# Patient Record
Sex: FEMALE | Race: WHITE | NOT HISPANIC OR LATINO | URBAN - METROPOLITAN AREA
[De-identification: names, ages, dates, MRNs, and addresses within clinical notes are randomized per-mention and may not be internally consistent; named-entity substitution may affect disease eponyms.]

---

## 2017-07-09 ENCOUNTER — COMMUNICATION - HEALTHEAST (OUTPATIENT)
Dept: FAMILY MEDICINE | Facility: CLINIC | Age: 57
End: 2017-07-09

## 2017-07-23 ENCOUNTER — COMMUNICATION - HEALTHEAST (OUTPATIENT)
Dept: FAMILY MEDICINE | Facility: CLINIC | Age: 57
End: 2017-07-23

## 2017-07-23 DIAGNOSIS — E78.5 HYPERLIPEMIA: ICD-10-CM

## 2017-09-25 ENCOUNTER — RECORDS - HEALTHEAST (OUTPATIENT)
Dept: ADMINISTRATIVE | Facility: OTHER | Age: 57
End: 2017-09-25

## 2017-11-03 ENCOUNTER — OFFICE VISIT - HEALTHEAST (OUTPATIENT)
Dept: FAMILY MEDICINE | Facility: CLINIC | Age: 57
End: 2017-11-03

## 2017-11-03 DIAGNOSIS — E78.5 HYPERLIPEMIA: ICD-10-CM

## 2017-11-03 DIAGNOSIS — Z12.31 VISIT FOR SCREENING MAMMOGRAM: ICD-10-CM

## 2017-11-03 DIAGNOSIS — M79.661 RIGHT CALF PAIN: ICD-10-CM

## 2017-11-03 DIAGNOSIS — Z12.11 SCREEN FOR COLON CANCER: ICD-10-CM

## 2017-11-06 ENCOUNTER — RECORDS - HEALTHEAST (OUTPATIENT)
Dept: VASCULAR ULTRASOUND | Facility: CLINIC | Age: 57
End: 2017-11-06

## 2017-11-06 ENCOUNTER — RECORDS - HEALTHEAST (OUTPATIENT)
Dept: ADMINISTRATIVE | Facility: OTHER | Age: 57
End: 2017-11-06

## 2017-11-06 DIAGNOSIS — M79.661 PAIN IN RIGHT LOWER LEG: ICD-10-CM

## 2017-11-22 ENCOUNTER — HOSPITAL ENCOUNTER (OUTPATIENT)
Dept: MAMMOGRAPHY | Facility: CLINIC | Age: 57
Discharge: HOME | End: 2017-11-22
Attending: FAMILY MEDICINE

## 2017-11-22 DIAGNOSIS — Z12.31 SCREENING MAMMOGRAM, ENCOUNTER FOR: ICD-10-CM

## 2018-01-23 ENCOUNTER — COMMUNICATION - HEALTHEAST (OUTPATIENT)
Dept: FAMILY MEDICINE | Facility: CLINIC | Age: 58
End: 2018-01-23

## 2018-01-23 DIAGNOSIS — E78.5 HYPERLIPEMIA: ICD-10-CM

## 2018-08-02 ENCOUNTER — COMMUNICATION - HEALTHEAST (OUTPATIENT)
Dept: FAMILY MEDICINE | Facility: CLINIC | Age: 58
End: 2018-08-02

## 2018-08-02 DIAGNOSIS — E78.5 HYPERLIPEMIA: ICD-10-CM

## 2018-10-25 ENCOUNTER — COMMUNICATION - HEALTHEAST (OUTPATIENT)
Dept: FAMILY MEDICINE | Facility: CLINIC | Age: 58
End: 2018-10-25

## 2018-10-25 DIAGNOSIS — E78.5 HYPERLIPEMIA: ICD-10-CM

## 2018-10-29 ENCOUNTER — RECORDS - HEALTHEAST (OUTPATIENT)
Dept: ADMINISTRATIVE | Facility: OTHER | Age: 58
End: 2018-10-29

## 2018-11-07 ENCOUNTER — OFFICE VISIT - HEALTHEAST (OUTPATIENT)
Dept: FAMILY MEDICINE | Facility: CLINIC | Age: 58
End: 2018-11-07

## 2018-11-07 DIAGNOSIS — Z12.11 SCREEN FOR COLON CANCER: ICD-10-CM

## 2018-11-07 DIAGNOSIS — E78.5 HYPERLIPEMIA: ICD-10-CM

## 2018-11-07 DIAGNOSIS — M54.42 ACUTE LEFT-SIDED LOW BACK PAIN WITH LEFT-SIDED SCIATICA: ICD-10-CM

## 2018-11-07 DIAGNOSIS — L65.9 HAIR LOSS: ICD-10-CM

## 2018-11-07 RX ORDER — PRAVASTATIN SODIUM 40 MG
40 TABLET ORAL AT BEDTIME
Qty: 90 TABLET | Refills: 3 | Status: SHIPPED | OUTPATIENT
Start: 2018-11-07

## 2018-11-08 LAB
CHOLEST SERPL-MCNC: 177 MG/DL
FASTING STATUS PATIENT QL REPORTED: NO
FERRITIN SERPL-MCNC: 200 NG/ML (ref 10–130)
HDLC SERPL-MCNC: 70 MG/DL
IRON SATN MFR SERPL: 16 % (ref 20–50)
IRON SERPL-MCNC: 47 UG/DL (ref 42–175)
LDLC SERPL CALC-MCNC: 93 MG/DL
TIBC SERPL-MCNC: 294 UG/DL (ref 313–563)
TRANSFERRIN SERPL-MCNC: 236 MG/DL (ref 212–360)
TRIGL SERPL-MCNC: 70 MG/DL
TSH SERPL DL<=0.005 MIU/L-ACNC: 1.34 UIU/ML (ref 0.3–5)
VIT B12 SERPL-MCNC: 361 PG/ML (ref 213–816)

## 2018-12-08 ENCOUNTER — HOSPITAL ENCOUNTER (OUTPATIENT)
Dept: MAMMOGRAPHY | Facility: CLINIC | Age: 58
Discharge: HOME | End: 2018-12-08
Attending: FAMILY MEDICINE

## 2018-12-08 DIAGNOSIS — Z12.31 VISIT FOR SCREENING MAMMOGRAM: ICD-10-CM

## 2018-12-09 ENCOUNTER — COMMUNICATION - HEALTHEAST (OUTPATIENT)
Dept: FAMILY MEDICINE | Facility: CLINIC | Age: 58
End: 2018-12-09

## 2018-12-09 DIAGNOSIS — E78.5 HYPERLIPEMIA: ICD-10-CM

## 2019-01-23 ENCOUNTER — COMMUNICATION - HEALTHEAST (OUTPATIENT)
Dept: FAMILY MEDICINE | Facility: CLINIC | Age: 59
End: 2019-01-23

## 2019-01-23 DIAGNOSIS — M54.42 ACUTE LEFT-SIDED LOW BACK PAIN WITH LEFT-SIDED SCIATICA: ICD-10-CM

## 2019-03-19 ENCOUNTER — COMMUNICATION - HEALTHEAST (OUTPATIENT)
Dept: FAMILY MEDICINE | Facility: CLINIC | Age: 59
End: 2019-03-19

## 2019-04-01 ENCOUNTER — COMMUNICATION - HEALTHEAST (OUTPATIENT)
Dept: FAMILY MEDICINE | Facility: CLINIC | Age: 59
End: 2019-04-01

## 2019-04-03 ENCOUNTER — AMBULATORY - HEALTHEAST (OUTPATIENT)
Dept: FAMILY MEDICINE | Facility: CLINIC | Age: 59
End: 2019-04-03

## 2019-04-03 DIAGNOSIS — F41.9 ANXIETY: ICD-10-CM

## 2019-04-03 RX ORDER — SERTRALINE HYDROCHLORIDE 25 MG/1
25 TABLET, FILM COATED ORAL DAILY
Qty: 30 TABLET | Refills: 4 | Status: SHIPPED | OUTPATIENT
Start: 2019-04-03

## 2019-04-17 ENCOUNTER — OFFICE VISIT - HEALTHEAST (OUTPATIENT)
Dept: FAMILY MEDICINE | Facility: CLINIC | Age: 59
End: 2019-04-17

## 2019-04-17 ENCOUNTER — COMMUNICATION - HEALTHEAST (OUTPATIENT)
Dept: FAMILY MEDICINE | Facility: CLINIC | Age: 59
End: 2019-04-17

## 2019-04-17 DIAGNOSIS — F41.1 GAD (GENERALIZED ANXIETY DISORDER): ICD-10-CM

## 2019-04-17 DIAGNOSIS — Z23 NEED FOR VACCINE FOR TD (TETANUS-DIPHTHERIA): ICD-10-CM

## 2019-04-17 DIAGNOSIS — D68.61 ANTIPHOSPHOLIPID ANTIBODY SYNDROME (H): ICD-10-CM

## 2019-04-17 RX ORDER — CLOPIDOGREL BISULFATE 75 MG/1
75 TABLET ORAL DAILY
Qty: 90 TABLET | Refills: 3 | Status: SHIPPED | OUTPATIENT
Start: 2019-04-17

## 2019-04-17 RX ORDER — ALPRAZOLAM 0.25 MG
0.25 TABLET ORAL 3 TIMES DAILY PRN
Qty: 30 TABLET | Refills: 1 | Status: SHIPPED | OUTPATIENT
Start: 2019-04-17

## 2019-04-17 ASSESSMENT — MIFFLIN-ST. JEOR: SCORE: 1073.55

## 2019-04-30 ENCOUNTER — COMMUNICATION - HEALTHEAST (OUTPATIENT)
Dept: FAMILY MEDICINE | Facility: CLINIC | Age: 59
End: 2019-04-30

## 2019-05-16 ENCOUNTER — COMMUNICATION - HEALTHEAST (OUTPATIENT)
Dept: FAMILY MEDICINE | Facility: CLINIC | Age: 59
End: 2019-05-16

## 2019-05-16 ENCOUNTER — RECORDS - HEALTHEAST (OUTPATIENT)
Dept: FAMILY MEDICINE | Facility: CLINIC | Age: 59
End: 2019-05-16

## 2019-05-16 DIAGNOSIS — D68.61 ANTIPHOSPHOLIPID ANTIBODY SYNDROME (H): ICD-10-CM

## 2019-05-24 ENCOUNTER — COMMUNICATION - HEALTHEAST (OUTPATIENT)
Dept: FAMILY MEDICINE | Facility: CLINIC | Age: 59
End: 2019-05-24

## 2021-05-24 ENCOUNTER — RECORDS - HEALTHEAST (OUTPATIENT)
Dept: ADMINISTRATIVE | Facility: CLINIC | Age: 61
End: 2021-05-24

## 2021-05-25 ENCOUNTER — RECORDS - HEALTHEAST (OUTPATIENT)
Dept: ADMINISTRATIVE | Facility: CLINIC | Age: 61
End: 2021-05-25

## 2021-05-26 ENCOUNTER — RECORDS - HEALTHEAST (OUTPATIENT)
Dept: ADMINISTRATIVE | Facility: CLINIC | Age: 61
End: 2021-05-26

## 2021-05-27 ENCOUNTER — RECORDS - HEALTHEAST (OUTPATIENT)
Dept: ADMINISTRATIVE | Facility: CLINIC | Age: 61
End: 2021-05-27

## 2021-05-27 NOTE — PROGRESS NOTES
Assessment/Plan:        Diagnoses and all orders for this visit:    Need for vaccine for Td (tetanus-diphtheria)  -     Tdap vaccine,  8yo or older,  IM    Antiphospholipid antibody syndrome (H)  -     clopidogrel (PLAVIX) 75 mg tablet; Take 1 tablet (75 mg total) by mouth daily. Please remind patient to sched [facility] for further refills  Dispense: 90 tablet; Refill: 3   I will contact Christy Thakkar, PharmD to help with figuring out transitional Lovenox. I will prescribe once I get her advise.    Patient will plan on returning to Plavix once she is done with the back injections.     IMANI (generalized anxiety disorder)  -     ALPRAZolam (XANAX) 0.25 MG tablet; Take 1 tablet (0.25 mg total) by mouth 3 (three) times a day as needed for anxiety.  Dispense: 30 tablet; Refill: 1            Subjective:    Patient ID: Lisette Velazquez is a 59 y.o. female.    HPI patient is here to help with transition from Plavix to Lovenox.  She is on Plavix for antiphospholipid antibody syndrome.  She is taking it for years to manage that.  Dr. More from neurology has been in charge of managing the Plavix.  She needs to have a short term of Lovenox so that she can get a back cortisone injection to help manage her back pain.  States that she has several herniated disks in her back.  She is currently packing up her life in [place] and moving to [place] on a full-time basis.  Feels like she has a lot of stress and anxiety from that.  If she recently started sertraline to help manage the anxiety.  But she is not interested in taking a daily medication of an SSRI.  Would just like to have a short-term medication that will help manage her anxiety as she needs it.    Patient states that she would like to take some of her medications get rid of them.  She will can reconsider her migraine medicine, once she moves to [place] and develops a relationship with another neurologist.  Patient states that she will be working long distance from her job  "and [place] while she is living in [place].    The following portions of the patient's history were reviewed and updated as appropriate: allergies, current medications, past family history, past medical history, past social history, past surgical history and problem list.    Review of Systems   Musculoskeletal: Positive for back pain.   Psychiatric/Behavioral: The patient is nervous/anxious.    All other systems reviewed and are negative.            Objective:    Physical Exam  /70 (Patient Site: Left Arm, Patient Position: Sitting, Cuff Size: Adult Regular)   Pulse (!) 57   Resp 18   Ht 5' 0.75\" (1.543 m)   Wt 129 lb (58.5 kg)   LMP 11/06/2009   SpO2 99%   BMI 24.58 kg/m    General Appearance:  Alert, cooperative, no distress, appears stated age   Head:  Normocephalic, without obvious abnormality, atraumatic   Eyes:  PERRL, conjunctiva/corneas clear, EOM's intact   Ears:  Normal TM's and external ear canals, both ears   Throat: Lips, mucosa, and tongue normal; teeth and gums normal   Neck: Supple, symmetrical, trachea midline, no adenopathy, thyroid: not enlarged, symmetric, no tenderness/mass/nodules   Back:   Symmetric, no curvature, ROM normal, no CVA tenderness   Lungs:   Clear to auscultation bilaterally, respirations unlabored   Chest Wall:  No tenderness or deformity   Heart:  Regular rate and rhythm, S1, S2 normal, no murmur, rub or gallop   Abdomen:   Soft, non-tender, bowel sounds active all four quadrants,  no masses, no organomegaly             "

## 2021-05-27 NOTE — TELEPHONE ENCOUNTER
Spoke with Christy Thakkar, anticoagulant Pharmacist; Patient has been on a bridging situation before with a previous surgery. Dr Meza had a plan that she got advise from a  hemotolgist and dr Villasenor. Plan worked well patient before. ANDREW Thakkar recommends sticking with the similar plan that worked well before.  Plan is to hold Plavix for 7 days before procedure, and start Lovenox 40 mg once daily while off of Plavix.  Hold Lovenox the day of procedure.  Restart Plavix or Lovenox as soon as neurologist feels it is safe after surgery, preferably day of procedure.     Spoke with patient over the phone; she verbalized understanding and agreed with that plan.     Shante Bright, DEREK, CNP

## 2021-05-27 NOTE — PATIENT INSTRUCTIONS - HE
Diet doctor.[email or web address] as a online resource for eating a low carbohydrate high fat diet.    The Big Fat Lie (book)    Dr Marcus Mack The Obesity Code   Dr Álvaro Gardiner (Fat Chance)  Dr Sage Stephenson (Lipidologist)    Low carb high fat diet   -Whole real foods. Avoid processed foods especially foods high in processed carbohydrates and sugars.   - 50-70 grams of carbs in a day,   -4 palm sized protein in a day,   -4 or more servings of veggies/day. Avoid starchy vegetables.    - Minimize sugars and fruits. (except berries; strawberries, blueberries, blackberries, raspberries).  -Good healthy fats; avocados, whole milk, cheese, full fat yogurt, nuts, natural nut butters, cream cheese, heavy cream.   -eat protein and healthy fats meals for breakfast and lunch; avoid carbohydrates for these meals.

## 2021-05-28 ENCOUNTER — RECORDS - HEALTHEAST (OUTPATIENT)
Dept: ADMINISTRATIVE | Facility: CLINIC | Age: 61
End: 2021-05-28

## 2021-05-28 NOTE — TELEPHONE ENCOUNTER
Medication Request  Medication name:   enoxaparin (LOVENOX) 40 mg/0.4 mL syringe 9 Syringe 0 4/17/2019  --   Sig - Route: Inject 0.4 mL (40 mg total) under the skin daily. - Subcutaneous   Sent to pharmacy as: enoxaparin (LOVENOX) 40 mg/0.4 mL syringe       Pharmacy Name and Location: [facility]  Reason for request: Patient stated I have to repeat my spinal injection and be taken off Plavix for 7 days prior to this spinal injection that is scheduled on 5/31/19    When did you use medication last?:  Last ordered 4/17/19 with prior spinal injection.  Patient offered appointment:  patient declined  Okay to leave a detailed message: yes

## 2021-05-29 ENCOUNTER — RECORDS - HEALTHEAST (OUTPATIENT)
Dept: ADMINISTRATIVE | Facility: CLINIC | Age: 61
End: 2021-05-29

## 2021-05-31 VITALS — BODY MASS INDEX: 25.83 KG/M2 | WEIGHT: 135.56 LBS

## 2021-06-01 ENCOUNTER — RECORDS - HEALTHEAST (OUTPATIENT)
Dept: ADMINISTRATIVE | Facility: CLINIC | Age: 61
End: 2021-06-01

## 2021-06-02 ENCOUNTER — RECORDS - HEALTHEAST (OUTPATIENT)
Dept: ADMINISTRATIVE | Facility: CLINIC | Age: 61
End: 2021-06-02

## 2021-06-02 VITALS — BODY MASS INDEX: 24.95 KG/M2 | WEIGHT: 130.95 LBS

## 2021-06-02 VITALS — WEIGHT: 129 LBS | HEIGHT: 61 IN | BODY MASS INDEX: 24.35 KG/M2

## 2021-06-03 ENCOUNTER — RECORDS - HEALTHEAST (OUTPATIENT)
Dept: ADMINISTRATIVE | Facility: CLINIC | Age: 61
End: 2021-06-03

## 2021-06-13 NOTE — PROGRESS NOTES
ASSESSMENT:  1. Visit for screening mammogram     2. Hyperlipemia  pravastatin (PRAVACHOL) 40 MG tablet   3. Screen for colon cancer  Ambulatory referral for Colonoscopy   4. Right calf pain  US Venous Leg Right       PLAN:  Here today for complaints of right calf pain for the last 4-5 months.  The upper gastrocnemius is painful at times.  Patient thinks it is most likely muscular skeletal but like to make sure it is not a clot prior to some travel she has upcoming.  We will do ultrasound and follow for those results.  Patient also due for refills on her pravastatin and due to update colonoscopy and mammogram.  Orders placed today.  She will follow-up for physical in a couple of months.  No problem-specific Assessment & Plan notes found for this encounter.      There are no Patient Instructions on file for this visit.    Orders Placed This Encounter   Procedures     US Venous Leg Right     Standing Status:   Future     Number of Occurrences:   1     Standing Expiration Date:   11/3/2018     Order Specific Question:   Is the patient pregnant?     Answer:   No     Order Specific Question:   Can the procedure be changed per Radiologist protocol?     Answer:   Yes     Ambulatory referral for Colonoscopy     Referral Priority:   Routine     Referral Type:   Colonoscopy     Referral Reason:   Evaluation and Treatment     Requested Specialty:   Gastroenterology     Number of Visits Requested:   1     Medications Discontinued During This Encounter   Medication Reason     pravastatin (PRAVACHOL) 40 MG tablet Reorder       No Follow-up on file.    CHIEF COMPLAINT:  Chief Complaint   Patient presents with     Leg Pain     c/o right leg pain x 2 months        HISTORY OF PRESENT ILLNESS:  Lisette is a 57 y.o. female today complaining of right leg pain for about 2-4 months.  Patient is noticing pain in the upper calf area, pain does not follow a typical pattern, it hurts mostly when she overuses her muscles.  We will also do hurt at  random times throughout the day.  Is not related to certain activities or motions.  Pain is not relieved with rest.  There is been no redness, swelling, tenderness in the calf.  Patient has no history of clotting issues or bleeding disorders.  Also due to update some health maintenance activities today.  She does plan to schedule a physical in the coming months.  She has some upcoming travel in a couple of weeks and wants to make sure there is no significant issue causing this calf pain.    REVIEW OF SYSTEMS:      Pertinent items are noted in HPI.  All other systems are negative  PFSH:  Reviewed, no changes      TOBACCO USE:  History   Smoking Status     Former Smoker   Smokeless Tobacco     Not on file     Comment: quit apx 30yrs ago       VITALS:  Vitals:    11/03/17 0827   BP: 102/54   Patient Site: Right Arm   Patient Position: Sitting   Cuff Size: Adult Regular   Pulse: 66   Resp: 14   Weight: 135 lb 9 oz (61.5 kg)     Wt Readings from Last 3 Encounters:   11/03/17 135 lb 9 oz (61.5 kg)   08/26/16 131 lb (59.4 kg)   07/11/16 128 lb 8 oz (58.3 kg)       PHYSICAL EXAM:   /54 (Patient Site: Right Arm, Patient Position: Sitting, Cuff Size: Adult Regular)  Pulse 66  Resp 14  Wt 135 lb 9 oz (61.5 kg)  LMP 11/06/2009  BMI 25.83 kg/m2  General appearance: alert, appears stated age and cooperative  Extremities: extremities normal, atraumatic, no cyanosis or edema  Pulses: 2+ and symmetric  No t tenderness to palpation of the calf on the right, no redness, no swelling or heat over the upper calf. Muscles do not feel tight.  Skin: Skin color, texture, turgor normal. No rashes or lesions  Neurologic: Grossly normal  Psychologic: Mood and affect normal.      DATA REVIEWED:  Additional History from Old Records Summarized (2): 0  Decision to Obtain Records (1): 0  Radiology Tests Summarized or Ordered (1): US right leg  Labs Reviewed or Ordered (1): 0  Medicine Test Summarized or Ordered (1): 0  Independent Review  of EKG or X-RAY(2 each): 0    The visit lasted a total of 25 minutes face to face with the patient. Over 50% of the time was spent counseling and educating the patient about plan of care.    MEDICATIONS:  Current Outpatient Prescriptions   Medication Sig Dispense Refill     clopidogrel (PLAVIX) 75 mg tablet Take 75 mg by mouth daily. Please remind patient to sched [facility] appt for further refills       pravastatin (PRAVACHOL) 40 MG tablet TAKE ONE TABLET BY MOUTH AT BEDTIME 90 tablet 0     topiramate (TOPAMAX) 25 MG capsule Take 1 capsule by mouth 3 (three) times a day.       VANIQA 13.9 % cream APPLY AND GENTLY MASSAGE INTO AFFECTED AREA(S) TWO TIMES A DAY AT LEAST 8 HOURS APART. 45 g 0     verapamil (COVERA HS) 180 MG (CO) 24 hr tablet Take 180 mg by mouth daily.       butalbital-acetaminophen-caffeine (FIORICET, ESGIC) -40 mg per tablet Take 1 tablet by mouth every 4 (four) hours as needed for pain or headaches. 90 tablet 1     enoxaparin (LOVENOX) 40 mg/0.4 mL syringe 40mg SQ once daily for 10 days before surgery.  Stop 24 hours before surgery. 10 Syringe 0     No current facility-administered medications for this visit.        This note has been dictated using voice recognition software. Any grammatical or context distortions are unintentional and inherent to the software

## 2021-06-21 NOTE — PROGRESS NOTES
ASSESSMENT:  1. Acute left-sided low back pain with left-sided sciatica  methylPREDNISolone (MEDROL DOSEPACK) 4 mg tablet   2. Hyperlipemia  Lipid Cascade RANDOM    pravastatin (PRAVACHOL) 40 MG tablet   3. Screen for colon cancer  CANCELED: Ambulatory referral for Colonoscopy   4. Hair loss  Thyroid Cascade    Ferritin    Vitamin B12    Iron and Transferrin Iron Binding Capacity       PLAN:  Patient here today for medication check for her lipids.  Labs were done today, refill provided the pravastatin due for colon cancer screening and she will pursue colonoscopy this year.  Noticing some overall hair thinning and hair loss in the past little bit of time so labs were ordered today to evaluate this.  Follow-up with dermatology if this persists.  Also address left-sided sciatic pain today.  Decided to trial a Medrol Dosepak and see if this is helpful.  If not could pursue physical therapy as a next step.  Patient instructed on stretching today  No problem-specific Assessment & Plan notes found for this encounter.      There are no Patient Instructions on file for this visit.    Orders Placed This Encounter   Procedures     Lipid Cascade RANDOM     Order Specific Question:   Fasting is required?     Answer:   No     Thyroid Cascade     Ferritin     Vitamin B12     Iron and Transferrin Iron Binding Capacity     Medications Discontinued During This Encounter   Medication Reason     butalbital-acetaminophen-caffeine (FIORICET, ESGIC) -40 mg per tablet Therapy completed     enoxaparin (LOVENOX) 40 mg/0.4 mL syringe Therapy completed     pravastatin (PRAVACHOL) 40 MG tablet Reorder       No Follow-up on file.    CHIEF COMPLAINT:  Chief Complaint   Patient presents with     Medication Management     no concerns     Pain     2 months sciatica pain on left upper leg       HISTORY OF PRESENT ILLNESS:  Lisette is a 58 y.o. female day for medication check.  Patient has history of hyperlipidemia, takes pravastatin which has  been working well to control her lipids.  She is due for labs today.  Also has some complaints of general hair thinning or hair loss overall, no scarring or patchy hair loss but general thinning.  Patient wondering what could be contributing to this.  No recent diet changes or symptoms.  We discussed doing more of a laboratory workup for her iron status, thyroid to double check this.  If not finding anything could pursue dermatology referral.  Also discussed no new shampoos or changes in hair products.  Also complaining of some left-sided sciatic pain with airplane flights over the past several months to a year.  Patient travels a lot for work and notices that when she is in a confined seat she gets shooting pain into the left buttock and down the left leg.  It is quite sharp and bothersome.  Will occur outside of the airplane but not as severely.  It depending on the day at home when she is sitting it will have varying levels of severity.  She does have some associated low back pain worse on the left.  No bowel or bladder dysfunction.  Wondering what can be done as she has an upcoming plane flight next week.  She has tried on and off ibuprofen at home but no other remedies thus far.    REVIEW OF SYSTEMS:      Pertinent items are noted in HPI.  All other systems are negative  PFSH:  Reviewed, no changes      TOBACCO USE:  Social History     Tobacco Use   Smoking Status Former Smoker   Smokeless Tobacco Never Used   Tobacco Comment    quit apx 30yrs ago       VITALS:  Vitals:    11/07/18 1602   BP: 122/73   Patient Site: Left Arm   Patient Position: Sitting   Cuff Size: Adult Regular   Pulse: (!) 53   Resp: 14   Temp: 98  F (36.7  C)   TempSrc: Oral   SpO2: 100%   Weight: 130 lb 15.3 oz (59.4 kg)     Wt Readings from Last 3 Encounters:   11/07/18 130 lb 15.3 oz (59.4 kg)   11/03/17 135 lb 9 oz (61.5 kg)   08/26/16 131 lb (59.4 kg)       PHYSICAL EXAM:   /73 (Patient Site: Left Arm, Patient Position: Sitting,  Cuff Size: Adult Regular)   Pulse (!) 53   Temp 98  F (36.7  C) (Oral)   Resp 14   Wt 130 lb 15.3 oz (59.4 kg)   LMP 11/06/2009   SpO2 100%   BMI 24.95 kg/m     General appearance: alert, appears stated age and cooperative  Ears: normal TM's and external ear canals both ears  Nose: Nares normal. Septum midline. Mucosa normal. No drainage or sinus tenderness.  Throat: lips, mucosa, and tongue normal; teeth and gums normal  Neck: no adenopathy, no carotid bruit, no JVD, supple, symmetrical, trachea midline and thyroid not enlarged, symmetric, no tenderness/mass/nodules  Back: symmetric, no curvature. ROM normal. No CVA tenderness., SI joint tenderness left, low back tenderness left. Straight leg raise negative bilaterally.  Lungs: clear to auscultation bilaterally  Heart: regular rate and rhythm, S1, S2 normal, no murmur, click, rub or gallop  Extremities: extremities normal, atraumatic, no cyanosis or edema  Pulses: 2+ and symmetric    DATA REVIEWED:  Additional History from Old Records Summarized (2): reviewed recent visits  Decision to Obtain Records (1): 0  Radiology Tests Summarized or Ordered (1): 0  Labs Reviewed or Ordered (1): 1  Medicine Test Summarized or Ordered (1): 0  Independent Review of EKG or X-RAY(2 each): 0    The visit lasted a total of 30 minutes face to face with the patient. Over 50% of the time was spent counseling and educating the patient about plan of care.    MEDICATIONS:  Current Outpatient Medications   Medication Sig Dispense Refill     clopidogrel (PLAVIX) 75 mg tablet Take 75 mg by mouth daily. Please remind patient to sched [employer] appt for further refills       eflornithine (VANIQA) 13.9 % cream APPLY AND GENTLY MASSAGE INTO AFFECTED AREA(S) TWO TIMES A DAY AT LEAST 8 HOURS APART. 45 g 0     pravastatin (PRAVACHOL) 40 MG tablet Take 1 tablet (40 mg total) by mouth at bedtime. 90 tablet 3     topiramate (TOPAMAX) 25 MG capsule Take 1 capsule by mouth 3 (three) times a day.        verapamil (COVERA HS) 180 MG (CO) 24 hr tablet Take 180 mg by mouth daily.       No current facility-administered medications for this visit.        This note has been dictated using voice recognition software. Any grammatical or context distortions are unintentional and inherent to the software

## 2021-06-23 NOTE — TELEPHONE ENCOUNTER
RN cannot approve Refill Request    RN can NOT refill this medication med is not covered by policy/route to provider. Last office visit: 11/7/2018 Rosi Ludwig FNP Last Physical: Visit date not found Last MTM visit: Visit date not found Last visit same specialty: 11/7/2018 Rosi Ludwig FNP.  Next visit within 3 mo: Visit date not found  Next physical within 3 mo: Visit date not found      Itzel Mckinnon, [facility] Triage/Med Refill 1/23/2019    Requested Prescriptions   Pending Prescriptions Disp Refills     methylPREDNISolone (MEDROL DOSEPACK) 4 mg tablet [Pharmacy Med Name: METHYLPREDNISOLONE 4 MG DOSEPK]  0     Sig: FOLLOW PACKAGE DIRECTIONS    There is no refill protocol information for this order

## 2021-06-23 NOTE — TELEPHONE ENCOUNTER
Informed patient of providers message below. Patient is still having pain. Patient will call back if she wants to make a follow up appointment or if she would like to go to PT.

## 2021-06-23 NOTE — TELEPHONE ENCOUNTER
Is patient having symptoms again? Typically will not prescribe steroid over the phone. If symptoms occurring again we need to see her, otherwise could refer for PT.

## 2021-06-26 ENCOUNTER — HEALTH MAINTENANCE LETTER (OUTPATIENT)
Age: 61
End: 2021-06-26

## 2021-07-13 ENCOUNTER — RECORDS - HEALTHEAST (OUTPATIENT)
Dept: ADMINISTRATIVE | Facility: CLINIC | Age: 61
End: 2021-07-13

## 2021-07-21 ENCOUNTER — RECORDS - HEALTHEAST (OUTPATIENT)
Dept: ADMINISTRATIVE | Facility: CLINIC | Age: 61
End: 2021-07-21

## 2021-07-23 ENCOUNTER — RECORDS - HEALTHEAST (OUTPATIENT)
Dept: ADMINISTRATIVE | Facility: CLINIC | Age: 61
End: 2021-07-23

## 2021-10-16 ENCOUNTER — HEALTH MAINTENANCE LETTER (OUTPATIENT)
Age: 61
End: 2021-10-16

## 2022-07-23 ENCOUNTER — HEALTH MAINTENANCE LETTER (OUTPATIENT)
Age: 62
End: 2022-07-23

## 2022-09-25 ENCOUNTER — HEALTH MAINTENANCE LETTER (OUTPATIENT)
Age: 62
End: 2022-09-25

## 2023-05-13 ENCOUNTER — HEALTH MAINTENANCE LETTER (OUTPATIENT)
Age: 63
End: 2023-05-13

## 2023-08-06 ENCOUNTER — HEALTH MAINTENANCE LETTER (OUTPATIENT)
Age: 63
End: 2023-08-06